# Patient Record
(demographics unavailable — no encounter records)

---

## 2024-10-08 NOTE — HISTORY OF PRESENT ILLNESS
[FreeTextEntry1] : 40-year-old female presents for wwe. Menarche was at the age of 13.  She is breast feeding but has resumed her menses.  Her menses are typically every 24 to 25 days, lasting 5 days.  She states her menses are heavy but does not soak through more than 1 pad per hour.  She does get cramping.  She has a history of an abnormal Pap smear.  She has no other significant GYN history.  She is taking prenatal vitamins.  She is going to pelvic floor PT for pelvic floor dysfunction and rectus diastasis.  She is interested in getting another referral for pelvic floor PT today.  She is using condoms for contraception.  Patient was seen in April for an increase in her depression and anxiety symptoms.  She was given a prescription for Prozac but did not start taking this medication.  She felt like shortly after her visit her symptoms much improved.    Past medical history is significant for a cervical spine disc herniation and brain lesion that is being followed by neurology.  Past surgical history includes two  sections.  Family history is significant for a maternal cousin with breast cancer at the age of 32.  The patient had a mammogram done over the summer which was negative.  MyRisk testing was negative.  Social history is significant for social alcohol use.  She denies tobacco or illicit drugs.  GYN history as above.  OB history: -0-0-2.  She has a history of a full-term  section for oligohydramnios and nonreassuring fetal heart tracing followed by a repeat  section.  She is allergic to cephalosporins.  She is currently taking prenatal vitamins, probiotics and magnesium.

## 2024-10-08 NOTE — PLAN
[FreeTextEntry1] : 40-year-old female for well woman exam  1.  Pap done 2.  Patient with family history of breast cancer.  Rx given for screening mammogram. 3.  Patient getting pelvic floor PT for pelvic pain and rectus diastases.  Referral provided today. 4.  Annual exam in 1 year

## 2024-10-08 NOTE — PHYSICAL EXAM
[Chaperone Present] : A chaperone was present in the examining room during all aspects of the physical examination [[id number]] : A chaperone was present during the pelvic exam. [FreeTextEntry2] : DIAMOND Perez [Appropriately responsive] : appropriately responsive [Alert] : alert [No Acute Distress] : no acute distress [No Lymphadenopathy] : no lymphadenopathy [Regular Rate Rhythm] : regular rate rhythm [No Murmurs] : no murmurs [Clear to Auscultation B/L] : clear to auscultation bilaterally [Soft] : soft [Non-tender] : non-tender [Non-distended] : non-distended [No HSM] : No HSM [No Lesions] : no lesions [No Mass] : no mass [Oriented x3] : oriented x3 [Examination Of The Breasts] : a normal appearance [No Masses] : no breast masses were palpable [Labia Majora] : normal [Labia Minora] : normal [Normal] : normal [Uterine Adnexae] : normal

## 2025-04-28 NOTE — PLAN
[FreeTextEntry1] : 40-year-old female with hypoechoic cervical lesion measuring 1.7 cm in size noted on sonogram.  Differential diagnosis was reviewed with the patient.  She denies any pelvic pain or pressure.  Last Pap was abnormal but per ASCCP guidelines, plan to repeat in 1 year.  No colposcopy needed at this time.  Plan to have the patient return to the office in 2 to 3 weeks for a transvaginal sonogram with a GYN tech.  Will get further imaging at that time and decide if an MRI is needed.  Call parameters were reviewed.  The patient was given the opportunity to ask questions and all were answered to her satisfaction.

## 2025-04-28 NOTE — HISTORY OF PRESENT ILLNESS
[FreeTextEntry1] : 40-year-old female presents for visit to discuss recent sonogram that she had ordered by her GI. Menarche was at the age of 13.  She is breast feeding but has resumed her menses.  Her menses are typically every 24 to 25 days, lasting 5 days.  She states her menses are heavy but does not soak through more than 1 pad per hour.  She does get cramping.  She has a history of an abnormal Pap smear.  She has no other significant GYN history.  She is taking prenatal vitamins.  She is going to pelvic floor PT for pelvic floor dysfunction and rectus diastasis. She is using condoms for contraception.  Patient has been having GI symptoms and was ordered to have an abdominal and pelvic sonogram.  Pelvic sonogram was within normal limits aside from a 1.7 cm hyperechoic cervical lesion which was possibly consistent with lipoma leiomyoma.  The patient denies any pelvic pain or pressure.  Patient had her annual exam in 2024.  Her Pap was abnormal but per ASCCP guidelines plan to repeat in 1 year.  Past medical history is significant for a cervical spine disc herniation and brain lesion that is being followed by neurology.  Past surgical history includes two  sections.  Family history is significant for a maternal cousin with breast cancer at the age of 32.  The patient had a mammogram done over the summer which was negative.  MyRisk testing was negative.  Social history is significant for social alcohol use.  She denies tobacco or illicit drugs.  GYN history as above.  OB history: -0-0-2.  She has a history of a full-term  section for oligohydramnios and nonreassuring fetal heart tracing followed by a repeat  section.  She is allergic to cephalosporins.  She is currently taking prenatal vitamins, probiotics and magnesium.

## 2025-07-01 NOTE — DISCUSSION/SUMMARY
[FreeTextEntry1] : Palpitations Laboratory results requested from the patient's PCP and gastroenterologist Outpatient telemetry was ordered to rule out a significant tachy arrhythmia as the etiology of her palpitations An echocardiogram was ordered to rule out a cardiomyopathy or valvular abnormality as the etiology of her palpitations  RTO after testing [EKG obtained to assist in diagnosis and management of assessed problem(s)] : EKG obtained to assist in diagnosis and management of assessed problem(s)

## 2025-07-01 NOTE — HISTORY OF PRESENT ILLNESS
[FreeTextEntry1] : The patient is a 41-year-old white female with past medical history remarkable for gastroesophageal reflux disease and irritable bowel syndrome who presents for evaluation of palpitations. The palpitations occur 3-4 times per week.  They are associated with dyspnea and a cough. The patient does not exercise but reports that she has been walking occasionally and performing some strength training.  She is chest pain free. the patient consumes 2 to 3 cups of coffee per day and an adequate amount of water  Past medical history: Gastroesophageal reflux disease, IBS, questionable asthma  Past surgical history:  x 2 Social history: Non-smoker Family history: No premature CAD

## 2025-07-11 NOTE — HISTORY OF PRESENT ILLNESS
[FreeTextEntry1] : 40-year-old female presents for sono results. Menarche was at the age of 13.  She is breast feeding but has resumed her menses.  Her menses are typically every 24 to 25 days, lasting 5 days.  She states her menses are heavy but does not soak through more than 1 pad per hour.  She does get cramping.  She has a history of an abnormal Pap smear.  She has no other significant GYN history.  She is taking prenatal vitamins.  She is going to pelvic floor PT for pelvic floor dysfunction and rectus diastasis. She is using condoms for contraception.  Patient has been having GI symptoms and was ordered to have an abdominal and pelvic sonogram.  Pelvic sonogram was within normal limits aside from a 1.7 cm hyperechoic cervical lesion which was possibly consistent with lipoleiomyoma.  The patient denies any pelvic pain or pressure.  Patient had her annual exam in 2024.  Her Pap was abnormal but per ASCCP guidelines plan to repeat in 1 year.  Past medical history is significant for a cervical spine disc herniation and brain lesion that is being followed by neurology.  Past surgical history includes two  sections.  Family history is significant for a maternal cousin with breast cancer at the age of 32.  The patient had a mammogram done over the summer which was negative.  MyRisk testing was negative.  Social history is significant for social alcohol use.  She denies tobacco or illicit drugs.  GYN history as above.  OB history: -0-0-2.  She has a history of a full-term  section for oligohydramnios and nonreassuring fetal heart tracing followed by a repeat  section.  She is allergic to cephalosporins.  She is currently taking prenatal vitamins, probiotics and magnesium.

## 2025-07-11 NOTE — PLAN
[FreeTextEntry1] : 41-year-old female with recent pelvic sonogram showing a 1.7 cm hyperechoic cervical mass possible lipoleiomyoma.  Sonogram was done today in our office with the tech.  Nabothian cysts were noted on the cervix but no other masses were noted on the cervix.  She was noted to have a corpus luteal cyst on the left ovary.  No other significant findings were noted.  Reassurance was provided regarding the nabothian cyst.  No need for follow-up.  She will return to the office in October 2025 for her well woman exam.